# Patient Record
Sex: FEMALE | Employment: FULL TIME | URBAN - METROPOLITAN AREA
[De-identification: names, ages, dates, MRNs, and addresses within clinical notes are randomized per-mention and may not be internally consistent; named-entity substitution may affect disease eponyms.]

---

## 2019-07-22 ENCOUNTER — TELEPHONE (OUTPATIENT)
Dept: DERMATOLOGY | Facility: CLINIC | Age: 24
End: 2019-07-22

## 2019-07-22 NOTE — TELEPHONE ENCOUNTER
Spoke with patient regarding upcoming appointment on 8/1/19 at 745am. She said she was on vacation and will call us when when she [email or web address]    Milka